# Patient Record
Sex: FEMALE | Race: WHITE | NOT HISPANIC OR LATINO | Employment: FULL TIME | URBAN - METROPOLITAN AREA
[De-identification: names, ages, dates, MRNs, and addresses within clinical notes are randomized per-mention and may not be internally consistent; named-entity substitution may affect disease eponyms.]

---

## 2021-06-18 ENCOUNTER — TRANSCRIBE ORDERS (OUTPATIENT)
Dept: PHYSICAL THERAPY | Facility: CLINIC | Age: 46
End: 2021-06-18

## 2021-06-18 DIAGNOSIS — M54.50 ACUTE LOW BACK PAIN, UNSPECIFIED BACK PAIN LATERALITY, UNSPECIFIED WHETHER SCIATICA PRESENT: Primary | ICD-10-CM

## 2021-06-21 ENCOUNTER — TREATMENT (OUTPATIENT)
Dept: PHYSICAL THERAPY | Facility: CLINIC | Age: 46
End: 2021-06-21

## 2021-06-21 DIAGNOSIS — Z74.09 IMPAIRED FUNCTIONAL MOBILITY AND ACTIVITY TOLERANCE: ICD-10-CM

## 2021-06-21 DIAGNOSIS — M53.3 SI (SACROILIAC) JOINT DYSFUNCTION: Primary | ICD-10-CM

## 2021-06-21 DIAGNOSIS — M54.41 ACUTE RIGHT-SIDED LOW BACK PAIN WITH RIGHT-SIDED SCIATICA: ICD-10-CM

## 2021-06-21 PROCEDURE — 97140 MANUAL THERAPY 1/> REGIONS: CPT | Performed by: PHYSICAL THERAPIST

## 2021-06-21 PROCEDURE — 97110 THERAPEUTIC EXERCISES: CPT | Performed by: PHYSICAL THERAPIST

## 2021-06-21 PROCEDURE — 97014 ELECTRIC STIMULATION THERAPY: CPT | Performed by: PHYSICAL THERAPIST

## 2021-06-21 PROCEDURE — 97162 PT EVAL MOD COMPLEX 30 MIN: CPT | Performed by: PHYSICAL THERAPIST
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
## 2021-06-21 NOTE — PATIENT INSTRUCTIONS
Pt given digital and paper copy of [employer] Access code [email or web address]    Patient was educated on findings of evaluation, purpose of treatment, and goals for therapy.  Treatment options discussed and questions answered.  Patient was educated on exercises/self treatment/pain relief techniques.

## 2021-06-23 ENCOUNTER — TREATMENT (OUTPATIENT)
Dept: PHYSICAL THERAPY | Facility: CLINIC | Age: 46
End: 2021-06-23

## 2021-06-23 PROCEDURE — 97014 ELECTRIC STIMULATION THERAPY: CPT | Performed by: PHYSICAL THERAPIST

## 2021-06-23 PROCEDURE — 97140 MANUAL THERAPY 1/> REGIONS: CPT | Performed by: PHYSICAL THERAPIST

## 2021-06-23 PROCEDURE — 97110 THERAPEUTIC EXERCISES: CPT | Performed by: PHYSICAL THERAPIST

## 2021-06-23 NOTE — PATIENT INSTRUCTIONS
Access Code: [email or web address]  URL: [email or web address]  Date: 06/23/2021  Prepared by: Cary Gonsales    Exercises  Modified Sidelying Thoracic Rotation Arm in Front - 2 x daily - 1 sets - 10 reps - 5-10 hold  Supine Hip Adduction Isometric with Ball - 2 x daily - 1 sets - 15 reps - 5 hold  Supine Transversus Abdominis Bracing - Hands on Stomach - 2 x daily - 15 reps - 1 sets - 5 hold  Bent Knee Fallouts - 2 x daily - 1 sets - 15 reps  Hooklying Gluteal Sets - 2 x daily - 1 sets - 15 reps - 5 hold  Supine Transversus Abdominis Bracing with Heel Slide - 2 x daily - 10 reps - 1 sets  Seated Transversus Abdominis Bracing - 2 x daily - 10 reps - 1 sets - 5 hold

## 2021-06-25 ENCOUNTER — TREATMENT (OUTPATIENT)
Dept: PHYSICAL THERAPY | Facility: CLINIC | Age: 46
End: 2021-06-25

## 2021-06-25 PROCEDURE — 97112 NEUROMUSCULAR REEDUCATION: CPT | Performed by: PHYSICAL THERAPIST

## 2021-06-25 PROCEDURE — 97110 THERAPEUTIC EXERCISES: CPT | Performed by: PHYSICAL THERAPIST

## 2021-06-25 PROCEDURE — 97014 ELECTRIC STIMULATION THERAPY: CPT | Performed by: PHYSICAL THERAPIST

## 2021-06-25 NOTE — PATIENT INSTRUCTIONS
Access Code: [email or web address]  URL: [email or web address]  Date: 06/25/2021  Prepared by: Cary Gonsales    Exercises  Modified Sidelying Thoracic Rotation Arm in Front - 2 x daily - 1 sets - 10 reps - 5-10 hold  Supine Hip Adduction Isometric with Ball - 2 x daily - 1 sets - 15 reps - 5 hold  Supine Transversus Abdominis Bracing - Hands on Stomach - 2 x daily - 15 reps - 1 sets - 5 hold  Bent Knee Fallouts - 2 x daily - 1 sets - 15 reps  Hooklying Gluteal Sets - 2 x daily - 1 sets - 15 reps - 5 hold  Supine Transversus Abdominis Bracing with Heel Slide - 2 x daily - 10 reps - 1 sets  Seated Transversus Abdominis Bracing - 2 x daily - 10 reps - 1 sets - 5 hold  Hooklying Clamshell with Resistance - 1 x daily - 10 reps - 1 sets - 3 hold  Hooklying Isometric Clamshell - 1 x daily - 1 sets - 10 reps - 3 hold

## 2021-06-25 NOTE — PROGRESS NOTES
Physical Therapy Daily Progress Note    Visit # : 3  Madison Michaels reports: I sat more than usual at work yesterday and it was a rougher day.  Pain is 4/10 today.     Subjective     Objective   See Exercise, Manual, and Modality Logs for complete treatment.   Level maleoli/isch tub    Assessment/Plan  Pt presents with iliosacral symmetry today.  Good tolerance to advanced core stability today.  Pt was issued updated HEP printout to facilitate compliance and recall with ex progressions today.   Progress strengthening /stabilization /functional activity       Timed:  Manual Therapy:    5     mins  57605;  Therapeutic Exercise:    25     mins  69321;     Neuromuscular Chris:    8    mins  26737;    Therapeutic Activity:     -     mins  56258;     Gait Training:      -     mins  86881;     Ultrasound:     -     mins  52304;    Iontophoresis                 -     mins 11946    Untimed:  Electrical Stimulation:    15     mins  64255 ( );  Mech traction                   -     mins 79640  Dry Needling                  -     Min self pay    Timed Treatment:   38   mins   Total Treatment:     55   mins      Cary Gonsales, PT  Physical Therapist  [place] License # [id number]

## 2021-06-28 ENCOUNTER — TREATMENT (OUTPATIENT)
Dept: PHYSICAL THERAPY | Facility: CLINIC | Age: 46
End: 2021-06-28

## 2021-06-28 PROCEDURE — 97014 ELECTRIC STIMULATION THERAPY: CPT | Performed by: PHYSICAL THERAPIST

## 2021-06-28 PROCEDURE — 97110 THERAPEUTIC EXERCISES: CPT | Performed by: PHYSICAL THERAPIST

## 2021-06-28 NOTE — PROGRESS NOTES
Re-Assessment       Patient: Madison Michaels   : 1975  Diagnosis/ICD-10 Code:  SI (sacroiliac) joint dysfunction [M53.3]  Referring practitioner: KYLAH Borrero  Date of Initial Visit: Type: THERAPY  Noted: 2021  Today's Date: 2021  Patient seen for 5 sessions      Subjective:   Clinical Progress: improved  Home Program Compliance: Yes  Treatment has included: therapeutic exercise, neuromuscular re-education, therapeutic activity, electrical stimulation and cryotherapy    Subjective Evaluation    History of Present Illness  Mechanism of injury: The upper back pain has resolved since last visit but am still having R SI/buttock pain. Pt notes about a 50% improvement in symptoms since initiating PT.  Standing following by prolonged walking aggravates symptoms. It's hard to walk from my car to my apartment after work. Pain gets worse as the day goes on.     Pain  Current pain ratin  At worst pain ratin  Location: R SI/buttock  Quality: sharp and dull ache         Objective          Palpation     Right Tenderness of the lumbar paraspinals and piriformis.     Tenderness     Right Hip   Tenderness in the PSIS and sacroiliac joint.     Additional Tenderness Details  R isch tub sup    Active Range of Motion     Lumbar   Flexion: 45 degrees with pain  Extension: 10 degrees with pain  Left lateral flexion: 12 degrees with pain  Right lateral flexion: 10 degrees with pain    Additional Active Range of Motion Details  Stands in about 10 degrees of trunk flex    Strength/Myotome Testing     Left Hip   Planes of Motion   Flexion: 5    Right Hip   Planes of Motion   Flexion: 4 (R SI pain)    Right Knee   Flexion: 4 ( SI pain)  Extension: 4    Right Ankle/Foot   Dorsiflexion: 4  Plantar flexion: 4  Great toe extension: 3+    Tests       Thoracic   Positive slump (R slump).     Lumbar     Left   Positive crossed SLR.     Right   Positive femoral stretch and quadrant.     Right Pelvic Girdle/Sacrum    Positive: sacral spring.     Left Hip   Positive SHERI (R LBP).   SLR: Positive (LBP only).     Right Hip   Positive SHERI (R LBP).   SLR: Positive (able to raise 10 degrees with RLE pain to calf).     Additional Tests Details  R calf pain with RLE long axis distraction      Assessment/Plan   Pt presents with improved subjective complaints though ROM remains restricted with progressive RLE weakness with associated neural tension.  Pt may benefit from further medical testing to r/o discal component though her symptoms do not include significant morning pain.  Please advise.     PT Signature: Cary Gonsales, PT  [place] License # [id number]    Based upon review of the patient's progress and continued therapy plan, it is my medical opinion that Madison Michaels should continue physical therapy treatment at [facility]  [address] [place] [id number] [phone].    Signature: __________________________________      Timed:  Manual Therapy:    5     mins  43811;  Therapeutic Exercise:    25     mins  74957;     Neuromuscular Chris:    -    mins  05556;    Therapeutic Activity:     10     mins  51745;     Gait Training:      -     mins  05698;     Ultrasound:     -     mins  40269;    Iontophoresis                 -     mins 01537    Untimed:  Electrical Stimulation:    15     mins  59672 ( );  Mech traction                   -     mins 05842  Dry Needling                  -     Min self pay    Timed Treatment:   40   mins   Total Treatment:     55   mins

## 2021-06-28 NOTE — PROGRESS NOTES
Physical Therapy Daily Progress Note    Visit # : 4  Madison Michaels reports: I'm noticing some mid back pain that is new and began Saturday afternoon after work.  LBP is better; less trouble after walking.     Subjective     Objective          Palpation   Left   Hypertonic in the cervical paraspinals.   Tenderness of the cervical paraspinals.     Right   Hypertonic in the cervical paraspinals. Tenderness of the cervical paraspinals.     Cervical Spine Comments  Left cervical paraspinals: thoracic. Right cervical paraspinals: thoracic.     Tenderness   Cervical Spine   Tenderness in the spinous process (T8-L2).       See Exercise, Manual, and Modality Logs for complete treatment.     Assessment/Plan  Pt presents with thoracolumbar pain with associated palp tenderness.  None of the exercises appear to be aggravating symptoms.  Pt was educated on use of lumbar roll/book at feet for postural support.  Today estim was modified to T-L region in sitting. Pt was issued updated HEP printout to facilitate compliance and recall with ex progressions today.  Progress strengthening /stabilization /functional activity  Assess for occ med visit     Timed:  Manual Therapy:    -     mins  81024;  Therapeutic Exercise:    30     mins  64592;     Neuromuscular Chris:    -    mins  62050;    Therapeutic Activity:     5     mins  37673;     Gait Training:      -     mins  31531;     Ultrasound:     -     mins  63689;    Iontophoresis                 -     mins 79321    Untimed:  Electrical Stimulation:    15     mins  79462 ( );  Mech traction                   -     mins 16634  Dry Needling                  -     Min self pay    Timed Treatment:   35   mins   Total Treatment:     52   mins      Cary Gonsales, PT  Physical Therapist  [place] License # [id number]

## 2021-06-30 ENCOUNTER — TREATMENT (OUTPATIENT)
Dept: PHYSICAL THERAPY | Facility: CLINIC | Age: 46
End: 2021-06-30

## 2021-06-30 DIAGNOSIS — Z74.09 IMPAIRED FUNCTIONAL MOBILITY AND ACTIVITY TOLERANCE: ICD-10-CM

## 2021-06-30 DIAGNOSIS — M53.3 SI (SACROILIAC) JOINT DYSFUNCTION: Primary | ICD-10-CM

## 2021-06-30 DIAGNOSIS — M54.41 ACUTE RIGHT-SIDED LOW BACK PAIN WITH RIGHT-SIDED SCIATICA: ICD-10-CM

## 2021-06-30 PROCEDURE — 97014 ELECTRIC STIMULATION THERAPY: CPT | Performed by: PHYSICAL THERAPIST

## 2021-06-30 PROCEDURE — 97110 THERAPEUTIC EXERCISES: CPT | Performed by: PHYSICAL THERAPIST

## 2021-06-30 PROCEDURE — 97530 THERAPEUTIC ACTIVITIES: CPT | Performed by: PHYSICAL THERAPIST

## 2021-06-30 NOTE — PATIENT INSTRUCTIONS
Access Code: [email or web address]  URL: [email or web address]  Date: 06/30/2021  Prepared by: Cary Gonsales    Exercises  Modified Sidelying Thoracic Rotation Arm in Front - 2 x daily - 1 sets - 10 reps - 5-10 hold  Supine Hip Adduction Isometric with Ball - 2 x daily - 1 sets - 15 reps - 5 hold  Supine Transversus Abdominis Bracing - Hands on Stomach - 2 x daily - 15 reps - 1 sets - 5 hold  Bent Knee Fallouts - 2 x daily - 1 sets - 15 reps  Hooklying Gluteal Sets - 2 x daily - 1 sets - 15 reps - 5 hold  Supine Transversus Abdominis Bracing with Heel Slide - 2 x daily - 10 reps - 1 sets  Seated Transversus Abdominis Bracing - 2 x daily - 10 reps - 1 sets - 5 hold  Hooklying Clamshell with Resistance - 1 x daily - 10 reps - 1 sets - 3 hold  Hooklying Isometric Clamshell - 1 x daily - 1 sets - 10 reps - 3 hold  Supine Piriformis Stretch with Foot on Ground - 2 x daily - 1 sets - 3 reps - 20 hold  Supine Figure 4 Piriformis Stretch - 2 x daily - 1 sets - 3 reps - 20 hold  Seated Long Arc Quad - 3 x daily - 1 sets - 15 reps - 5 hold

## 2021-07-08 ENCOUNTER — TRANSCRIBE ORDERS (OUTPATIENT)
Dept: URGENT CARE | Facility: CLINIC | Age: 46
End: 2021-07-08

## 2021-07-08 ENCOUNTER — TREATMENT (OUTPATIENT)
Dept: PHYSICAL THERAPY | Facility: CLINIC | Age: 46
End: 2021-07-08

## 2021-07-08 DIAGNOSIS — Z74.09 IMPAIRED FUNCTIONAL MOBILITY AND ACTIVITY TOLERANCE: ICD-10-CM

## 2021-07-08 DIAGNOSIS — S39.012A LUMBAR STRAIN, INITIAL ENCOUNTER: Primary | ICD-10-CM

## 2021-07-08 DIAGNOSIS — M53.3 SI (SACROILIAC) JOINT DYSFUNCTION: Primary | ICD-10-CM

## 2021-07-08 DIAGNOSIS — M54.41 ACUTE RIGHT-SIDED LOW BACK PAIN WITH RIGHT-SIDED SCIATICA: ICD-10-CM

## 2021-07-08 DIAGNOSIS — M54.16 LUMBAR RADICULOPATHY: ICD-10-CM

## 2021-07-08 PROCEDURE — 97110 THERAPEUTIC EXERCISES: CPT | Performed by: PHYSICAL THERAPIST

## 2021-07-08 PROCEDURE — 97014 ELECTRIC STIMULATION THERAPY: CPT | Performed by: PHYSICAL THERAPIST

## 2021-07-08 NOTE — PROGRESS NOTES
Physical Therapy Daily Progress Note    Visit # : 6  Madison Farahnzel reports: saw MD and he has ordered a CT but it hasn't yet been scheduled.  I think I'm doing a little better with pain rated 2/10 this morning but gets worse as the day goes on.     Subjective     Objective   See Exercise, Manual, and Modality Logs for complete treatment.     Assessment/Plan  Good tolerance to exercise program today without reports of increased symptoms.  Pt demonstrates good core stability with exercise program/progression though had some L ant thigh cramping with LTR on physioball that eased following LTR with feet on table.   Progress per Plan of Care       Timed:  Manual Therapy:    -     mins  35046;  Therapeutic Exercise:    30     mins  46748;     Neuromuscular Chris:    -    mins  95929;    Therapeutic Activity:     -     mins  36468;     Gait Training:      -     mins  73196;     Ultrasound:     -     mins  33468;    Iontophoresis                 -     mins 64159    Untimed:  Electrical Stimulation:    15     mins  36452 ( );  Mech traction                   -     mins 18302  Dry Needling                  -     Min self pay    Timed Treatment:   30   mins   Total Treatment:     47   mins      Cary Gonsales PT  Physical Therapist  [place] License # [id number]

## 2021-07-14 ENCOUNTER — HOSPITAL ENCOUNTER (OUTPATIENT)
Dept: CT IMAGING | Facility: HOSPITAL | Age: 46
Discharge: HOME | End: 2021-07-14
Admitting: EMERGENCY MEDICINE

## 2021-07-14 DIAGNOSIS — S39.012A LUMBAR STRAIN, INITIAL ENCOUNTER: ICD-10-CM

## 2021-07-14 DIAGNOSIS — M54.16 LUMBAR RADICULOPATHY: ICD-10-CM

## 2021-07-14 PROCEDURE — 72131 CT LUMBAR SPINE W/O DYE: CPT

## 2022-09-26 ENCOUNTER — OFFICE VISIT (OUTPATIENT)
Dept: FAMILY MEDICINE CLINIC | Facility: CLINIC | Age: 47
End: 2022-09-26

## 2022-09-26 VITALS
RESPIRATION RATE: 18 BRPM | SYSTOLIC BLOOD PRESSURE: 102 MMHG | WEIGHT: 293 LBS | HEART RATE: 82 BPM | TEMPERATURE: 98.2 F | OXYGEN SATURATION: 97 % | DIASTOLIC BLOOD PRESSURE: 69 MMHG | BODY MASS INDEX: 45.99 KG/M2 | HEIGHT: 67 IN

## 2022-09-26 DIAGNOSIS — I10 PRIMARY HYPERTENSION: Primary | Chronic | ICD-10-CM

## 2022-09-26 DIAGNOSIS — G43.809 OTHER MIGRAINE WITHOUT STATUS MIGRAINOSUS, NOT INTRACTABLE: Chronic | ICD-10-CM

## 2022-09-26 DIAGNOSIS — R11.0 NAUSEA: ICD-10-CM

## 2022-09-26 DIAGNOSIS — Z00.00 ENCOUNTER FOR PHYSICAL EXAMINATION: ICD-10-CM

## 2022-09-26 DIAGNOSIS — E66.9 OBESITY WITHOUT SERIOUS COMORBIDITY, UNSPECIFIED CLASSIFICATION, UNSPECIFIED OBESITY TYPE: Chronic | ICD-10-CM

## 2022-09-26 DIAGNOSIS — Z71.89 ADVANCED CARE PLANNING/COUNSELING DISCUSSION: Chronic | ICD-10-CM

## 2022-09-26 PROBLEM — G43.909 MIGRAINES: Chronic | Status: ACTIVE | Noted: 2022-09-26

## 2022-09-26 PROCEDURE — 99204 OFFICE O/P NEW MOD 45 MIN: CPT | Performed by: NURSE PRACTITIONER

## 2022-09-26 RX ORDER — LISINOPRIL AND HYDROCHLOROTHIAZIDE 25; 20 MG/1; MG/1
TABLET ORAL
Qty: 90 TABLET | Refills: 3 | Status: SHIPPED | OUTPATIENT
Start: 2022-09-26

## 2022-09-26 RX ORDER — SCOLOPAMINE TRANSDERMAL SYSTEM 1 MG/1
1 PATCH, EXTENDED RELEASE TRANSDERMAL
Qty: 1 PATCH | Refills: 0 | Status: SHIPPED | OUTPATIENT
Start: 2022-09-26 | End: 2023-03-01

## 2022-09-26 RX ORDER — ONDANSETRON 4 MG/1
4 TABLET, FILM COATED ORAL EVERY 8 HOURS PRN
Qty: 21 TABLET | Refills: 0 | Status: SHIPPED | OUTPATIENT
Start: 2022-09-26 | End: 2023-03-01

## 2022-09-26 NOTE — ASSESSMENT & PLAN NOTE
/69  Monitor blood pressure  On Lisinopril-hydrochlorothiazide   Check CMP   
Encourage diet and exercise   BMI 51.8  
On Ubrevly   Controlled  With aura     
Patient is DNR   
Scopolamine patch and zofran  Patient going on cruise soon- sea sickness   
Home

## 2022-09-26 NOTE — PROGRESS NOTES
Chief Complaint  Chief Complaint   Patient presents with   • Establish Care   • Hypertension        Subjective          Madison Michaels is here today to establish care.    Essential HTN- She checks her BP at home. BP high this past week 170/90, however received COVID vaccine last weekend and was noit feeling that well. Usually runs 150's/80's. Denies chest pain, shortness of breath, dizziness, headache, or lightheadedness.     Migraines- On Ubrevly at home and helps. Has the aura prior to migraines. States will take 4 excedrin migraine and Mt. Dew and will go away. Last migraine in May 2022. Processed foods make migraines worse.     Sea sickness- Going on cruise soon and has history sea sickness.    Obesity- Does not exercise regularly and eats too much.     Patient is DNR, Has paperwork- advanced care planning discussed     She is from [place]   Previous PCP was Muriel Avalos   Marital status- not   Children- Yes  Works as RN   Exercise- not at all, just at work   Diet- Eating too much food     Labs- Due   Colonoscopy- Next year due   Mammogram- Refused   Papsmear- 2022, Refused       Vaccines:  Flu- Refused   Tdap- Up to date   Covid-19- Up to date on initial dose and boosters X3     Dental exam-  Eye exam-    Code status: DNR    Last peroids 9/17/2022- Not menopausal yet         Review of Systems   Constitutional: Negative for chills and fatigue.   HENT: Negative for congestion.    Respiratory: Negative for shortness of breath.    Cardiovascular: Negative for chest pain.   Gastrointestinal: Negative for abdominal pain, nausea and vomiting.   Genitourinary: Negative for difficulty urinating.   Musculoskeletal: Negative for arthralgias.   Skin: Negative.    Neurological: Positive for headache. Negative for dizziness and light-headedness.   Psychiatric/Behavioral: Negative for depressed mood. The patient is not nervous/anxious.         Objective   Vital Signs:   Vitals:    09/26/22 0958   BP: 102/69  "  Pulse: 82   Resp: 18   Temp: 98.2 °F (36.8 °C)   SpO2: 97%      Estimated body mass index is 51.84 kg/m² as calculated from the following:    Height as of this encounter: 170.2 cm (67\").    Weight as of this encounter: 150 kg (331 lb).    Class 3 Severe Obesity (BMI >=40). Obesity-related health conditions include the following: hypertension. Obesity is unchanged. BMI is is above average; BMI management plan is completed. We discussed portion control and increasing exercise.      Physical Exam  Vitals reviewed.   Constitutional:       Appearance: Normal appearance. She is obese.   HENT:      Head: Normocephalic and atraumatic.      Nose: Nose normal.      Mouth/Throat:      Mouth: Mucous membranes are moist.      Pharynx: Oropharynx is clear.   Eyes:      Extraocular Movements: Extraocular movements intact.      Conjunctiva/sclera: Conjunctivae normal.      Pupils: Pupils are equal, round, and reactive to light.   Cardiovascular:      Rate and Rhythm: Normal rate and regular rhythm.      Pulses: Normal pulses.      Heart sounds: Normal heart sounds.      Comments: S1, S2 audible  Pulmonary:      Effort: Pulmonary effort is normal.      Breath sounds: Normal breath sounds.      Comments: On room air   Abdominal:      General: Abdomen is flat. Bowel sounds are normal.      Palpations: Abdomen is soft.   Musculoskeletal:         General: Normal range of motion.      Cervical back: Normal range of motion.      Comments: Hard nodule noted left clavicle    Skin:     General: Skin is warm and dry.   Neurological:      General: No focal deficit present.      Mental Status: She is alert and oriented to person, place, and time. Mental status is at baseline.   Psychiatric:         Mood and Affect: Mood normal.         Behavior: Behavior normal.         Thought Content: Thought content normal.         Judgment: Judgment normal.                Physical Exam   Result Review :               Procedures       Assessment and Plan "     Diagnoses and all orders for this visit:    1. Primary hypertension (Primary)  Assessment & Plan:  /69  Monitor blood pressure  On Lisinopril-hydrochlorothiazide   Check CMP       2. Obesity without serious comorbidity, unspecified classification, unspecified obesity type  Assessment & Plan:  Encourage diet and exercise   BMI 51.8      3. Other migraine without status migrainosus, not intractable  Assessment & Plan:  On Ubrevly   Controlled  With aura         4. Encounter for physical examination  -     Comprehensive Metabolic Panel; Future  -     CBC & Differential; Future  -     Lipid Panel; Future  -     Hepatitis C Antibody; Future  -     TSH; Future  -     T4, Free; Future    5. Nausea  Assessment & Plan:  Scopolamine patch and zofran  Patient going on cruise soon- sea sickness       6. Advanced care planning/counseling discussion  Assessment & Plan:  Patient is DNR       Other orders  -     lisinopril-hydrochlorothiazide (PRINZIDE,ZESTORETIC) 20-25 MG per tablet; take 1 tablet by mouth daily  Dispense: 90 tablet; Refill: 3  -     Scopolamine (Transderm-Scop, 1.5 MG,) 1 MG/3DAYS patch; Place 1 patch on the skin as directed by provider Every 72 (Seventy-Two) Hours.  Dispense: 1 patch; Refill: 0  -     ondansetron (Zofran) 4 MG tablet; Take 1 tablet by mouth Every 8 (Eight) Hours As Needed for Nausea or Vomiting.  Dispense: 21 tablet; Refill: 0  -     ubrogepant (ubrogepant) 100 MG tablet; Take 1 tablet by mouth As Needed (Migraines).  Dispense: 30 tablet; Refill: 1            Follow Up   Return in about 1 year (around 9/26/2023) for Annual physical.   Patient was given instructions and counseling regarding her condition or for health maintenance advice. Please see specific information pulled into the AVS if appropriate.

## 2022-10-06 ENCOUNTER — PATIENT ROUNDING (BHMG ONLY) (OUTPATIENT)
Dept: FAMILY MEDICINE CLINIC | Facility: CLINIC | Age: 47
End: 2022-10-06

## 2023-03-01 ENCOUNTER — OFFICE VISIT (OUTPATIENT)
Dept: FAMILY MEDICINE CLINIC | Facility: CLINIC | Age: 48
End: 2023-03-01
Payer: COMMERCIAL

## 2023-03-01 VITALS
TEMPERATURE: 98.2 F | HEART RATE: 75 BPM | HEIGHT: 67 IN | BODY MASS INDEX: 45.99 KG/M2 | SYSTOLIC BLOOD PRESSURE: 116 MMHG | RESPIRATION RATE: 16 BRPM | WEIGHT: 293 LBS | DIASTOLIC BLOOD PRESSURE: 73 MMHG | OXYGEN SATURATION: 98 %

## 2023-03-01 DIAGNOSIS — F51.04 PSYCHOPHYSIOLOGICAL INSOMNIA: ICD-10-CM

## 2023-03-01 DIAGNOSIS — F41.8 ANXIETY WITH DEPRESSION: Primary | ICD-10-CM

## 2023-03-01 PROBLEM — G47.00 INSOMNIA: Status: ACTIVE | Noted: 2023-03-01

## 2023-03-01 PROCEDURE — 99214 OFFICE O/P EST MOD 30 MIN: CPT | Performed by: NURSE PRACTITIONER

## 2023-03-01 RX ORDER — HYDROXYZINE HYDROCHLORIDE 10 MG/1
10 TABLET, FILM COATED ORAL NIGHTLY
Qty: 30 TABLET | Refills: 0 | Status: SHIPPED | OUTPATIENT
Start: 2023-03-01

## 2023-03-01 NOTE — ASSESSMENT & PLAN NOTE
She noticed it last November. She just thought she had a mood, then thought holiday blues. January got to the point she called in twice at work. Has not felt like getting out of bed. She reports she has days she wants to burst into tears and has bad anxiety driving into work. She explains this has never been her. She thinks she maybe starting menopause. She explains taking a hot bath has helped with some stuff. Has never tried medication before. Has never talked to a counselor. Denies SI or HI.     Encourage counseling- reach out [employer] at work   Prescribed trintellix

## 2023-03-01 NOTE — ASSESSMENT & PLAN NOTE
Likely secondary to anxiety  Reports she wakes up at 3:00am and has trouble sleeping    Prescribed hydroxyzine as needed for anxiety

## 2023-09-28 RX ORDER — LISINOPRIL AND HYDROCHLOROTHIAZIDE 25; 20 MG/1; MG/1
TABLET ORAL
Qty: 30 TABLET | OUTPATIENT
Start: 2023-09-28

## 2023-09-28 NOTE — TELEPHONE ENCOUNTER
Patient is on her way out of town for a .  Will call us when she gets back in town to schedule nurse visit.  Patient states she has enough medication to get her through for now.

## 2023-09-28 NOTE — TELEPHONE ENCOUNTER
Rx Refill Note  Requested Prescriptions     Pending Prescriptions Disp Refills    lisinopril-hydrochlorothiazide (PRINZIDE,ZESTORETIC) 20-25 MG per tablet [Pharmacy Med Name: LISINOPRIL-HYDROCHLOROTHIAZIDE 20-25 MG TAB] 30 tablet      Sig: TAKE 1 TABLET BY MOUTH EVERY DAY      Last office visit with prescribing clinician: 3/1/2023   Last telemedicine visit with prescribing clinician: Visit date not found   Next office visit with prescribing clinician: Visit date not found                         Would you like a call back once the refill request has been completed: [] Yes [] No    If the office needs to give you a call back, can they leave a voicemail: [] Yes [] No    Sofia Forman, RT  09/28/23, 10:52 EDT

## 2023-09-28 NOTE — TELEPHONE ENCOUNTER
Patient needs blood pressure check and CMP before refills. It has been over a year since these were done

## 2023-10-02 NOTE — TELEPHONE ENCOUNTER
Incoming Refill Request      Medication requested (name and dose): Ubrogepant 100mg    Pharmacy where request should be sent: [facility]    Additional details provided by patient: n/a    Best call back number:     Does the patient have less than a 3 day supply:  [] Yes  [x] No    Loyd Tripathi Rep  10/02/23, 08:46 EDT

## 2023-11-27 ENCOUNTER — TELEPHONE (OUTPATIENT)
Dept: FAMILY MEDICINE CLINIC | Facility: CLINIC | Age: 48
End: 2023-11-27
Payer: COMMERCIAL

## 2023-11-27 NOTE — TELEPHONE ENCOUNTER
Caller: [facility]    Relationship: Pharmacy BENEFIT MANAGER     Best call back number: [phone]    What is the best time to reach you: ANY    Who are you requesting to speak with (clinical staff, provider,  specific staff member): OSMIN FRANKS    Do you know the name of the person who called: JUJU    What was the call regarding: [facility] IS WORKING ON PRIOR AUTHORIZATION FOR ubrogepant (Ubrelvy) 100 MG tablet . THEY ARE NEEDING ONE NOTE FROM A VISIT WHERE THIS MEDICATION WAS MENTIONED.     THEY ALSO SENT A FAX TODAY 11-27-23 AROUND 2:22 PM,     PLEASE CALL WITH ANY FURTHER QUESTIONS

## 2023-12-08 NOTE — PROGRESS NOTES
Chief Complaint  Chief Complaint   Patient presents with    Hypertension    Med Refill        Subjective          Madison Michaels is a 48-year-old female who presents to the office today for annual physical.     Annual physical- Denies any new family history. Denies smoking, socially drinks, denies illegal drug use.     Hypertension- Denies CP, SOA, Dizziness, lightheadedness, HA. She has been checking BP at home and has been 130/70. Compliant on medication.     Anxiety with depression- She never started taking her anxiety and depression medication from the last time. She thought working at [employer] was the issue and quit her job. She now has a new job. She reports her boyfriend  recently -history of liver transplant. Deneis SI or HI.     Morbid Obesity- She would like something to help her lose weight. She has been emotional eating since the passing of her boyfriend. She walks her dog twice. She tries to eat healthy.     Labs-Due  Colonoscopy- Refused   Mammogram- Refused   Papsmear-Refused       Vaccines:  Flu-Refused  Covid-19- Received some doses     Dental exam- Up to date   Eye exam- Up to date          Review of Systems   Constitutional:  Negative for chills and fever.   HENT:  Negative for congestion.    Respiratory:  Negative for shortness of breath.    Cardiovascular:  Negative for chest pain.   Gastrointestinal:  Negative for abdominal pain, nausea and vomiting.   Genitourinary:  Negative for difficulty urinating.   Musculoskeletal:  Negative for myalgias.   Skin: Negative.    Neurological:  Negative for dizziness, light-headedness and headache.   Psychiatric/Behavioral:  Positive for depressed mood. Negative for self-injury and suicidal ideas. The patient is nervous/anxious.         Objective   Vital Signs:   Vitals:    23 1422   BP: 128/81   Pulse: 85   Temp: 97.8 °F (36.6 °C)   SpO2: 99%      Estimated body mass index is 55.59 kg/m² as calculated from the following:    Height as of this  "encounter: 170.2 cm (67.01\").    Weight as of this encounter: 161 kg (355 lb).    Class 3 Severe Obesity (BMI >=40). Obesity-related health conditions include the following: none. Obesity is unchanged. BMI is is above average; BMI management plan is completed. We discussed portion control and increasing exercise.            Patient screened positive for depression based on a PHQ-9 score of 0 on 12/11/2023. Follow-up recommendations include: PCP managing depression.        Physical Exam  Vitals reviewed.   Constitutional:       Appearance: Normal appearance. She is obese.   HENT:      Head: Normocephalic and atraumatic.      Nose: Nose normal.      Mouth/Throat:      Mouth: Mucous membranes are moist.      Pharynx: Oropharynx is clear.   Eyes:      Extraocular Movements: Extraocular movements intact.      Conjunctiva/sclera: Conjunctivae normal.   Cardiovascular:      Rate and Rhythm: Normal rate and regular rhythm.      Pulses: Normal pulses.      Heart sounds: Normal heart sounds.      Comments: S1, S2 audible  Pulmonary:      Effort: Pulmonary effort is normal.      Breath sounds: Normal breath sounds.      Comments: On room air   Abdominal:      General: Abdomen is flat.      Palpations: Abdomen is soft.   Musculoskeletal:         General: Normal range of motion.      Cervical back: Normal range of motion.   Skin:     General: Skin is warm and dry.   Neurological:      General: No focal deficit present.      Mental Status: She is alert and oriented to person, place, and time. Mental status is at baseline.   Psychiatric:         Mood and Affect: Mood normal.         Behavior: Behavior normal.         Thought Content: Thought content normal.         Judgment: Judgment normal.                Physical Exam   Result Review :             Procedures       Assessment and Plan     Diagnoses and all orders for this visit:    1. Primary hypertension (Primary)  Assessment & Plan:  Hypertension- Denies CP, SOA, Dizziness, " lightheadedness, HA. She has been checking BP at home and has been 130/70. Compliant on medication.     BP: 128/81     Continue hydrochlorothiazide     Check CMP     Orders:  -     Comprehensive Metabolic Panel    2. Other migraine without status migrainosus, not intractable  Assessment & Plan:  Insurance denied Ubelvry    Prescribed Imitrex           3. Anxiety with depression  Assessment & Plan:  Anxiety with depression- She never started taking her anxiety and depression medication from the last time. She thought working at [employer] was the issue and quit her job. She now has a new job. She reports her boyfriend  recently -history of liver transplant. Denies SI or HI.     Encourage counseling as needed    Wellbutrin prescribed       4. Encounter for physical examination  Assessment & Plan:  Annual physical- Denies any new family history. Denies smoking, socially drinks, denies illegal drug use.     Hypertension- Denies CP, SOA, Dizziness, lightheadedness, HA. She has been checking BP at home and has been 130/70. Compliant on medication.     Anxiety with depression- She never started taking her anxiety and depression medication from the last time. She thought working at [employer] was the issue and quit her job. She now has a new job. She reports her boyfriend  recently -history of liver transplant. Deneis SI or HI.       Labs-Due  Colonoscopy- Refused   Mammogram- Refused   Papsmear-Refused       Vaccines:  Flu-Refused  Shingles vaccine- Refused   Covid-19- Received some doses     Dental exam- Up to date   Eye exam- Up to date     Encourage healthy diet and exercise  Encourage monthly self breast exams  Check labs- Patient check CMP       5. Morbid (severe) obesity due to excess calories  Assessment & Plan:  Patient's (Body mass index is 55.59 kg/m².)     Morbid Obesity- She would like something to help her lose weight. She has been emotional eating since the passing of her boyfriend. She walks her dog twice.  She tries to eat healthy.     Prescribed phentermine   Encourage exercise 3 days a week and healthy diet     Orders:  -     phentermine 15 MG capsule; Take 1 capsule by mouth Every Morning.  Dispense: 30 capsule; Refill: 0    Other orders  -     SUMAtriptan (Imitrex) 50 MG tablet; Take one tablet at onset of headache. May repeat dose one time in 2 hours if headache not relieved.  Dispense: 9 tablet; Refill: 2  -     buPROPion XL (Wellbutrin XL) 150 MG 24 hr tablet; Take 1 tablet by mouth Daily.  Dispense: 90 tablet; Refill: 0  -     lisinopril-hydrochlorothiazide (PRINZIDE,ZESTORETIC) 20-25 MG per tablet; take 1 tablet by mouth daily  Dispense: 90 tablet; Refill: 3              Follow Up   Return in about 1 year (around 12/11/2024) for Annual physical.   Patient was given instructions and counseling regarding her condition or for health maintenance advice. Please see specific information pulled into the AVS if appropriate.

## 2023-12-11 ENCOUNTER — OFFICE VISIT (OUTPATIENT)
Dept: FAMILY MEDICINE CLINIC | Facility: CLINIC | Age: 48
End: 2023-12-11
Payer: COMMERCIAL

## 2023-12-11 VITALS
TEMPERATURE: 97.8 F | DIASTOLIC BLOOD PRESSURE: 81 MMHG | OXYGEN SATURATION: 99 % | HEART RATE: 85 BPM | BODY MASS INDEX: 45.99 KG/M2 | SYSTOLIC BLOOD PRESSURE: 128 MMHG | HEIGHT: 67 IN | WEIGHT: 293 LBS

## 2023-12-11 DIAGNOSIS — E66.01 MORBID (SEVERE) OBESITY DUE TO EXCESS CALORIES: ICD-10-CM

## 2023-12-11 DIAGNOSIS — Z00.00 ENCOUNTER FOR PHYSICAL EXAMINATION: ICD-10-CM

## 2023-12-11 DIAGNOSIS — I10 PRIMARY HYPERTENSION: Primary | Chronic | ICD-10-CM

## 2023-12-11 DIAGNOSIS — G43.809 OTHER MIGRAINE WITHOUT STATUS MIGRAINOSUS, NOT INTRACTABLE: Chronic | ICD-10-CM

## 2023-12-11 DIAGNOSIS — F41.8 ANXIETY WITH DEPRESSION: ICD-10-CM

## 2023-12-11 PROBLEM — R11.0 NAUSEA: Status: RESOLVED | Noted: 2022-09-26 | Resolved: 2023-12-11

## 2023-12-11 PROCEDURE — 80053 COMPREHEN METABOLIC PANEL: CPT | Performed by: NURSE PRACTITIONER

## 2023-12-11 RX ORDER — SUMATRIPTAN 50 MG/1
TABLET, FILM COATED ORAL
Qty: 9 TABLET | Refills: 2 | Status: SHIPPED | OUTPATIENT
Start: 2023-12-11

## 2023-12-11 RX ORDER — BUPROPION HYDROCHLORIDE 150 MG/1
150 TABLET ORAL DAILY
Qty: 90 TABLET | Refills: 0 | Status: SHIPPED | OUTPATIENT
Start: 2023-12-11

## 2023-12-11 RX ORDER — LISINOPRIL AND HYDROCHLOROTHIAZIDE 25; 20 MG/1; MG/1
TABLET ORAL
Qty: 90 TABLET | Refills: 3 | Status: SHIPPED | OUTPATIENT
Start: 2023-12-11

## 2023-12-11 RX ORDER — PHENTERMINE HYDROCHLORIDE 15 MG/1
15 CAPSULE ORAL EVERY MORNING
Qty: 30 CAPSULE | Refills: 0 | Status: SHIPPED | OUTPATIENT
Start: 2023-12-11

## 2023-12-11 NOTE — PROGRESS NOTES
Venipuncture performed in right arm  by Netta Graham MA with good hemostasis. Patient tolerated well. Netta Graham MA 04/14/23

## 2023-12-11 NOTE — PATIENT INSTRUCTIONS
Encourage follow-up 1 year for annual physical   Check labs   Encourage healthy diet and exercise  Encourage monthly self breast exams

## 2023-12-11 NOTE — ASSESSMENT & PLAN NOTE
Anxiety with depression- She never started taking her anxiety and depression medication from the last time. She thought working at [employer] was the issue and quit her job. She now has a new job. She reports her boyfriend  recently -history of liver transplant. Denies SI or HI.     Encourage counseling as needed    Wellbutrin prescribed

## 2023-12-11 NOTE — ASSESSMENT & PLAN NOTE
Patient's (Body mass index is 55.59 kg/m².)     Morbid Obesity- She would like something to help her lose weight. She has been emotional eating since the passing of her boyfriend. She walks her dog twice. She tries to eat healthy.     Prescribed phentermine   Encourage exercise 3 days a week and healthy diet

## 2023-12-11 NOTE — ASSESSMENT & PLAN NOTE
Hypertension- Denies CP, SOA, Dizziness, lightheadedness, HA. She has been checking BP at home and has been 130/70. Compliant on medication.     BP: 128/81     Continue hydrochlorothiazide     Check CMP

## 2023-12-11 NOTE — ASSESSMENT & PLAN NOTE
Annual physical- Denies any new family history. Denies smoking, socially drinks, denies illegal drug use.     Hypertension- Denies CP, SOA, Dizziness, lightheadedness, HA. She has been checking BP at home and has been 130/70. Compliant on medication.     Anxiety with depression- She never started taking her anxiety and depression medication from the last time. She thought working at [employer] was the issue and quit her job. She now has a new job. She reports her boyfriend  recently -history of liver transplant. Deneis SI or HI.       Labs-Due  Colonoscopy- Refused   Mammogram- Refused   Papsmear-Refused       Vaccines:  Flu-Refused  Shingles vaccine- Refused   Covid-19- Received some doses     Dental exam- Up to date   Eye exam- Up to date     Encourage healthy diet and exercise  Encourage monthly self breast exams  Check labs- Patient check CMP

## 2023-12-12 PROBLEM — R73.9 ELEVATED BLOOD SUGAR: Status: ACTIVE | Noted: 2023-12-12

## 2023-12-12 LAB
ALBUMIN SERPL-MCNC: 4.1 G/DL (ref 3.5–5.2)
ALBUMIN/GLOB SERPL: 1.4 G/DL
ALP SERPL-CCNC: 56 U/L (ref 39–117)
ALT SERPL W P-5'-P-CCNC: 28 U/L (ref 1–33)
ANION GAP SERPL CALCULATED.3IONS-SCNC: 11.6 MMOL/L (ref 5–15)
AST SERPL-CCNC: 28 U/L (ref 1–32)
BILIRUB SERPL-MCNC: 0.5 MG/DL (ref 0–1.2)
BUN SERPL-MCNC: 8 MG/DL (ref 6–20)
BUN/CREAT SERPL: 11.1 (ref 7–25)
CALCIUM SPEC-SCNC: 9.4 MG/DL (ref 8.6–10.5)
CHLORIDE SERPL-SCNC: 99 MMOL/L (ref 98–107)
CO2 SERPL-SCNC: 25.4 MMOL/L (ref 22–29)
CREAT SERPL-MCNC: 0.72 MG/DL (ref 0.57–1)
EGFRCR SERPLBLD CKD-EPI 2021: 102 ML/MIN/1.73
GLOBULIN UR ELPH-MCNC: 2.9 GM/DL
GLUCOSE SERPL-MCNC: 109 MG/DL (ref 65–99)
POTASSIUM SERPL-SCNC: 4.2 MMOL/L (ref 3.5–5.2)
PROT SERPL-MCNC: 7 G/DL (ref 6–8.5)
SODIUM SERPL-SCNC: 136 MMOL/L (ref 136–145)

## 2025-04-29 RX ORDER — LISINOPRIL AND HYDROCHLOROTHIAZIDE 20; 25 MG/1; MG/1
TABLET ORAL
Qty: 90 TABLET | Refills: 0 | Status: SHIPPED | OUTPATIENT
Start: 2025-04-29

## 2025-08-12 ENCOUNTER — OFFICE VISIT (OUTPATIENT)
Dept: FAMILY MEDICINE CLINIC | Facility: CLINIC | Age: 50
End: 2025-08-12
Payer: COMMERCIAL

## 2025-08-12 VITALS
HEART RATE: 88 BPM | BODY MASS INDEX: 45.99 KG/M2 | SYSTOLIC BLOOD PRESSURE: 139 MMHG | OXYGEN SATURATION: 98 % | TEMPERATURE: 98.4 F | WEIGHT: 293 LBS | HEIGHT: 67 IN | DIASTOLIC BLOOD PRESSURE: 82 MMHG

## 2025-08-12 DIAGNOSIS — E66.01 MORBID (SEVERE) OBESITY DUE TO EXCESS CALORIES: ICD-10-CM

## 2025-08-12 DIAGNOSIS — Z00.00 ENCOUNTER FOR PHYSICAL EXAMINATION: ICD-10-CM

## 2025-08-12 DIAGNOSIS — I10 PRIMARY HYPERTENSION: Primary | Chronic | ICD-10-CM

## 2025-08-12 DIAGNOSIS — Z11.59 ENCOUNTER FOR HEPATITIS C SCREENING TEST FOR LOW RISK PATIENT: ICD-10-CM

## 2025-08-12 DIAGNOSIS — G43.809 OTHER MIGRAINE WITHOUT STATUS MIGRAINOSUS, NOT INTRACTABLE: Chronic | ICD-10-CM

## 2025-08-12 DIAGNOSIS — F41.8 ANXIETY WITH DEPRESSION: ICD-10-CM

## 2025-08-12 RX ORDER — LISINOPRIL AND HYDROCHLOROTHIAZIDE 20; 25 MG/1; MG/1
TABLET ORAL
Qty: 90 TABLET | Refills: 0 | Status: SHIPPED | OUTPATIENT
Start: 2025-08-12